# Patient Record
Sex: MALE | Race: BLACK OR AFRICAN AMERICAN | Employment: FULL TIME | URBAN - METROPOLITAN AREA
[De-identification: names, ages, dates, MRNs, and addresses within clinical notes are randomized per-mention and may not be internally consistent; named-entity substitution may affect disease eponyms.]

---

## 2023-12-11 ENCOUNTER — HOSPITAL ENCOUNTER (OUTPATIENT)
Facility: HOSPITAL | Age: 43
Discharge: HOME | End: 2023-12-14
Payer: COMMERCIAL

## 2023-12-11 VITALS
RESPIRATION RATE: 18 BRPM | BODY MASS INDEX: 24.39 KG/M2 | WEIGHT: 190.04 LBS | HEART RATE: 79 BPM | DIASTOLIC BLOOD PRESSURE: 88 MMHG | SYSTOLIC BLOOD PRESSURE: 172 MMHG | HEIGHT: 74 IN | TEMPERATURE: 97.9 F

## 2023-12-11 LAB
ABO + RH BLD: NORMAL
ALBUMIN SERPL-MCNC: 3.8 G/DL (ref 3.5–5)
ALBUMIN/GLOB SERPL: 1.1 (ref 1.1–2.2)
ALP SERPL-CCNC: 81 U/L (ref 45–117)
ALT SERPL-CCNC: <6 U/L (ref 12–78)
ANION GAP SERPL CALC-SCNC: 7 MMOL/L (ref 5–15)
APTT PPP: 27.1 SEC (ref 22.1–31)
AST SERPL-CCNC: 8 U/L (ref 15–37)
BASOPHILS # BLD: 0 K/UL (ref 0–0.1)
BASOPHILS NFR BLD: 0 % (ref 0–1)
BILIRUB SERPL-MCNC: 0.4 MG/DL (ref 0.2–1)
BLOOD GROUP ANTIBODIES SERPL: NORMAL
BUN SERPL-MCNC: 33 MG/DL (ref 6–20)
BUN/CREAT SERPL: 3 (ref 12–20)
CALCIUM SERPL-MCNC: 8.7 MG/DL (ref 8.5–10.1)
CHLORIDE SERPL-SCNC: 98 MMOL/L (ref 97–108)
CO2 SERPL-SCNC: 33 MMOL/L (ref 21–32)
CREAT SERPL-MCNC: 9.85 MG/DL (ref 0.7–1.3)
DIFFERENTIAL METHOD BLD: ABNORMAL
EKG ATRIAL RATE: 76 BPM
EKG DIAGNOSIS: NORMAL
EKG P AXIS: 73 DEGREES
EKG P-R INTERVAL: 166 MS
EKG Q-T INTERVAL: 408 MS
EKG QRS DURATION: 110 MS
EKG QTC CALCULATION (BAZETT): 459 MS
EKG R AXIS: 62 DEGREES
EKG T AXIS: 78 DEGREES
EKG VENTRICULAR RATE: 76 BPM
EOSINOPHIL # BLD: 0.1 K/UL (ref 0–0.4)
EOSINOPHIL NFR BLD: 1 % (ref 0–7)
ERYTHROCYTE [DISTWIDTH] IN BLOOD BY AUTOMATED COUNT: 17.7 % (ref 11.5–14.5)
EST. AVERAGE GLUCOSE BLD GHB EST-MCNC: 85 MG/DL
GLOBULIN SER CALC-MCNC: 3.6 G/DL (ref 2–4)
GLUCOSE SERPL-MCNC: 126 MG/DL (ref 65–100)
HBA1C MFR BLD: 4.6 % (ref 4–5.6)
HCT VFR BLD AUTO: 31.2 % (ref 36.6–50.3)
HGB BLD-MCNC: 9.6 G/DL (ref 12.1–17)
IMM GRANULOCYTES # BLD AUTO: 0 K/UL (ref 0–0.04)
IMM GRANULOCYTES NFR BLD AUTO: 0 % (ref 0–0.5)
INR PPP: 1 (ref 0.9–1.1)
LYMPHOCYTES # BLD: 1 K/UL (ref 0.8–3.5)
LYMPHOCYTES NFR BLD: 11 % (ref 12–49)
MCH RBC QN AUTO: 29 PG (ref 26–34)
MCHC RBC AUTO-ENTMCNC: 30.8 G/DL (ref 30–36.5)
MCV RBC AUTO: 94.3 FL (ref 80–99)
MONOCYTES # BLD: 0.8 K/UL (ref 0–1)
MONOCYTES NFR BLD: 9 % (ref 5–13)
NEUTS SEG # BLD: 6.6 K/UL (ref 1.8–8)
NEUTS SEG NFR BLD: 79 % (ref 32–75)
NRBC # BLD: 0 K/UL (ref 0–0.01)
NRBC BLD-RTO: 0 PER 100 WBC
PLATELET # BLD AUTO: 374 K/UL (ref 150–400)
PMV BLD AUTO: 10.5 FL (ref 8.9–12.9)
POTASSIUM SERPL-SCNC: 5.7 MMOL/L (ref 3.5–5.1)
PROT SERPL-MCNC: 7.4 G/DL (ref 6.4–8.2)
PROTHROMBIN TIME: 10.4 SEC (ref 9–11.1)
RBC # BLD AUTO: 3.31 M/UL (ref 4.1–5.7)
SODIUM SERPL-SCNC: 138 MMOL/L (ref 136–145)
SPECIMEN EXP DATE BLD: NORMAL
THERAPEUTIC RANGE: NORMAL SECS (ref 58–77)
WBC # BLD AUTO: 8.5 K/UL (ref 4.1–11.1)

## 2023-12-11 PROCEDURE — 93005 ELECTROCARDIOGRAM TRACING: CPT

## 2023-12-11 PROCEDURE — 83036 HEMOGLOBIN GLYCOSYLATED A1C: CPT

## 2023-12-11 PROCEDURE — 85610 PROTHROMBIN TIME: CPT

## 2023-12-11 PROCEDURE — 80053 COMPREHEN METABOLIC PANEL: CPT

## 2023-12-11 PROCEDURE — 85025 COMPLETE CBC W/AUTO DIFF WBC: CPT

## 2023-12-11 PROCEDURE — 86900 BLOOD TYPING SEROLOGIC ABO: CPT

## 2023-12-11 PROCEDURE — 86901 BLOOD TYPING SEROLOGIC RH(D): CPT

## 2023-12-11 PROCEDURE — 86850 RBC ANTIBODY SCREEN: CPT

## 2023-12-11 PROCEDURE — 85730 THROMBOPLASTIN TIME PARTIAL: CPT

## 2023-12-11 PROCEDURE — 36415 COLL VENOUS BLD VENIPUNCTURE: CPT

## 2023-12-11 RX ORDER — AMLODIPINE BESYLATE 10 MG/1
10 TABLET ORAL NIGHTLY
COMMUNITY

## 2023-12-11 RX ORDER — CHOLECALCIFEROL (VITAMIN D3) 1250 MCG
1 CAPSULE ORAL DAILY
COMMUNITY

## 2023-12-11 RX ORDER — CALCITRIOL 0.25 UG/1
0.25 CAPSULE, LIQUID FILLED ORAL DAILY
COMMUNITY

## 2023-12-11 ASSESSMENT — PAIN SCALES - GENERAL: PAINLEVEL_OUTOF10: 5

## 2023-12-11 ASSESSMENT — PAIN DESCRIPTION - ORIENTATION: ORIENTATION: LEFT

## 2023-12-11 ASSESSMENT — PAIN DESCRIPTION - DESCRIPTORS: DESCRIPTORS: ACHING;SORE

## 2023-12-11 ASSESSMENT — PAIN DESCRIPTION - LOCATION: LOCATION: GROIN

## 2023-12-11 ASSESSMENT — PAIN DESCRIPTION - PAIN TYPE: TYPE: ACUTE PAIN;SURGICAL PAIN

## 2023-12-11 NOTE — PERIOP NOTE
[address] INSTRUCTIONS    Surgery Date:   12/18/23    Your surgeon's office or [facility] staff will call you between 4 PM- 8 PM the day before surgery with your arrival time. If your surgery is on a Monday, you will receive a call the preceding Friday. If your surgeon;s office has given you arrival time, then go by that time. Please report to [facility] Patient Access/Admitting on the 1st floor. Bring your insurance card, photo identification, and any copayment ( if applicable). If you are going home the same day of your surgery, you must have a responsible adult to drive you home. You need to have a responsible adult to stay with you the first 24 hours after surgery and you should not drive a car for 24 hours following your surgery. If you are being admitted to the hospital, please leave personal belongings/luggage in your car until you have an assigned hospital room number. Nothing to eat or drink after midnight the night before surgery. This includes no water, gum, mints, coffee, juice, etc.  Please note special instructions, if applicable, below for medications. Do NOT drink alcohol or smoke 24 hours before surgery. STOP smoking for 14 days prior as it helps with breathing and healing after surgery. Please wear comfortable clothes. Wear glasses instead of contacts. We ask that all money and jewelry and valuables to be left at home. Wear no makeup, particularly mascara the day of surgery. All body piercings, rings, and jewelry need to be removed and left at home. Remove all nail polish except for clear. Please wear your hair loose or down. Please no pony-tails, buns, or any metal hair accessories. You may wear deodorant, unless having breast surgery. Do not shave any body area within 24 hours of your surgery. Please follow all instructions to avoid any potential surgical cancellation.   Should your physical condition change, (i.e. fever, cold, flu, etc.) please notify your

## 2023-12-11 NOTE — PERIOP NOTE
PT HAS HISTORY OF MRSA. SPOKE [address], NP. RECEIVED ORDER FOR MRSA SWAB DURING PAT APPOINTMENT.

## 2023-12-12 LAB
BACTERIA SPEC CULT: NORMAL
BACTERIA SPEC CULT: NORMAL
SERVICE CMNT-IMP: NORMAL

## 2023-12-12 NOTE — PERIOP NOTE
CALLED TO DR. GOLDEN'S OFFICE/SPOKE TO DASHA FOR NURSE TO CALL RE ABN PAT LABS.  (ALSO ADVISED THAT THESE LABS AND EKG WERE FAXED OVER THIS MORNING);    K+ 5.7  BUN 33  CREATININE 9.85  BUN/CREATININE RATIO 3  GFR 6  HGB 9.6  HCT 31.2

## 2024-04-01 NOTE — PERIOP NOTE
Phone interview completed with patient.  Pre-op instructions reviewed and discussed.  Medications reviewed and instructions given on when/if to stop/take prior to surgery.  Opportunity given for patient to ask questions, and questions answered.      Patient instructed to purchase CHG soap or Hibiclens OTC and follow directions as listed; advised to use night before surgery and day of surgery.      Patient stated he no longer wants to have the left dialysis graft placement done but wants to proceed with the removal of the PD catheter.  He stated he is just tired of all the procedures and being cut by doctors. Instructed him to contact Dr. Marion's office with this information. He will call them today.      Dennis at [facility] contacted for most recent office note and EKG to be faxed to PAT; fax number provided.  Patient did not know name of cardiac physician; he said he has been \"transferred around\" multiple times, starting at [facility], then [facility] and now [facility].

## 2024-04-04 ENCOUNTER — ANESTHESIA (OUTPATIENT)
Facility: HOSPITAL | Age: 44
End: 2024-04-04
Payer: COMMERCIAL

## 2024-04-04 ENCOUNTER — ANESTHESIA EVENT (OUTPATIENT)
Facility: HOSPITAL | Age: 44
End: 2024-04-04
Payer: COMMERCIAL

## 2024-04-04 ENCOUNTER — HOSPITAL ENCOUNTER (OUTPATIENT)
Facility: HOSPITAL | Age: 44
Setting detail: OUTPATIENT SURGERY
Discharge: HOME | End: 2024-04-04
Payer: COMMERCIAL

## 2024-04-04 VITALS
HEART RATE: 85 BPM | TEMPERATURE: 98.5 F | WEIGHT: 189 LBS | HEIGHT: 74 IN | RESPIRATION RATE: 14 BRPM | OXYGEN SATURATION: 97 % | DIASTOLIC BLOOD PRESSURE: 70 MMHG | SYSTOLIC BLOOD PRESSURE: 100 MMHG | BODY MASS INDEX: 24.26 KG/M2

## 2024-04-04 DIAGNOSIS — N18.6 ESRD (END STAGE RENAL DISEASE) (HCC): Primary | ICD-10-CM

## 2024-04-04 LAB
ANION GAP BLD CALC-SCNC: 6.6 MMOL/L (ref 10–20)
CA-I BLD-MCNC: 1.02 MMOL/L (ref 1.12–1.32)
CHLORIDE BLD-SCNC: 101 MMOL/L (ref 98–107)
CO2 BLD-SCNC: 28.4 MMOL/L (ref 21–32)
CREAT BLD-MCNC: 8.69 MG/DL (ref 0.6–1.3)
GLUCOSE BLD STRIP.AUTO-MCNC: 178 MG/DL (ref 65–117)
GLUCOSE BLD-MCNC: 96 MG/DL (ref 65–100)
POTASSIUM BLD-SCNC: 4.8 MMOL/L (ref 3.5–5.1)
SERVICE CMNT-IMP: ABNORMAL
SERVICE CMNT-IMP: ABNORMAL
SODIUM BLD-SCNC: 136 MMOL/L (ref 136–145)

## 2024-04-04 PROCEDURE — 3700000000 HC ANESTHESIA ATTENDED CARE

## 2024-04-04 PROCEDURE — 6360000002 HC RX W HCPCS

## 2024-04-04 PROCEDURE — 6360000002 HC RX W HCPCS: Performed by: ANESTHESIOLOGY

## 2024-04-04 PROCEDURE — 7100000010 HC PHASE II RECOVERY - FIRST 15 MIN

## 2024-04-04 PROCEDURE — 2500000003 HC RX 250 WO HCPCS

## 2024-04-04 PROCEDURE — 2500000003 HC RX 250 WO HCPCS: Performed by: NURSE ANESTHETIST, CERTIFIED REGISTERED

## 2024-04-04 PROCEDURE — 7100000000 HC PACU RECOVERY - FIRST 15 MIN

## 2024-04-04 PROCEDURE — 6370000000 HC RX 637 (ALT 250 FOR IP)

## 2024-04-04 PROCEDURE — 2580000003 HC RX 258: Performed by: NURSE ANESTHETIST, CERTIFIED REGISTERED

## 2024-04-04 PROCEDURE — 2580000003 HC RX 258: Performed by: ANESTHESIOLOGY

## 2024-04-04 PROCEDURE — 2580000003 HC RX 258

## 2024-04-04 PROCEDURE — 6370000000 HC RX 637 (ALT 250 FOR IP): Performed by: ANESTHESIOLOGY

## 2024-04-04 PROCEDURE — 7100000001 HC PACU RECOVERY - ADDTL 15 MIN

## 2024-04-04 PROCEDURE — 2709999900 HC NON-CHARGEABLE SUPPLY

## 2024-04-04 PROCEDURE — 80047 BASIC METABLC PNL IONIZED CA: CPT

## 2024-04-04 PROCEDURE — 64415 NJX AA&/STRD BRCH PLXS IMG: CPT | Performed by: ANESTHESIOLOGY

## 2024-04-04 PROCEDURE — 6360000002 HC RX W HCPCS: Performed by: NURSE ANESTHETIST, CERTIFIED REGISTERED

## 2024-04-04 PROCEDURE — 3600000012 HC SURGERY LEVEL 2 ADDTL 15MIN

## 2024-04-04 PROCEDURE — 3600000002 HC SURGERY LEVEL 2 BASE

## 2024-04-04 PROCEDURE — 82962 GLUCOSE BLOOD TEST: CPT

## 2024-04-04 PROCEDURE — 3700000001 HC ADD 15 MINUTES (ANESTHESIA)

## 2024-04-04 PROCEDURE — C1768 GRAFT, VASCULAR: HCPCS

## 2024-04-04 DEVICE — PROPATEN VASCULAR GRAFT TW 7MMX80CM HEPARIN
Type: IMPLANTABLE DEVICE | Site: ARM | Status: FUNCTIONAL
Brand: GORE PROPATEN VASCULAR GRAFT

## 2024-04-04 RX ORDER — EPHEDRINE SULFATE 50 MG/ML
INJECTION INTRAVENOUS PRN
Status: DISCONTINUED | OUTPATIENT
Start: 2024-04-04 | End: 2024-04-04 | Stop reason: SDUPTHER

## 2024-04-04 RX ORDER — NALOXONE HYDROCHLORIDE 0.4 MG/ML
INJECTION, SOLUTION INTRAMUSCULAR; INTRAVENOUS; SUBCUTANEOUS PRN
Status: DISCONTINUED | OUTPATIENT
Start: 2024-04-04 | End: 2024-04-04 | Stop reason: HOSPADM

## 2024-04-04 RX ORDER — SODIUM CHLORIDE 9 MG/ML
INJECTION, SOLUTION INTRAVENOUS PRN
Status: DISCONTINUED | OUTPATIENT
Start: 2024-04-04 | End: 2024-04-04 | Stop reason: HOSPADM

## 2024-04-04 RX ORDER — VASOPRESSIN 20 U/ML
INJECTION PARENTERAL PRN
Status: DISCONTINUED | OUTPATIENT
Start: 2024-04-04 | End: 2024-04-04 | Stop reason: SDUPTHER

## 2024-04-04 RX ORDER — OXYCODONE HYDROCHLORIDE 5 MG/1
5 TABLET ORAL
Status: DISCONTINUED | OUTPATIENT
Start: 2024-04-04 | End: 2024-04-04 | Stop reason: HOSPADM

## 2024-04-04 RX ORDER — PHENYLEPHRINE HCL IN 0.9% NACL 0.4MG/10ML
SYRINGE (ML) INTRAVENOUS PRN
Status: DISCONTINUED | OUTPATIENT
Start: 2024-04-04 | End: 2024-04-04 | Stop reason: SDUPTHER

## 2024-04-04 RX ORDER — LIDOCAINE HYDROCHLORIDE 10 MG/ML
1 INJECTION, SOLUTION EPIDURAL; INFILTRATION; INTRACAUDAL; PERINEURAL
Status: DISCONTINUED | OUTPATIENT
Start: 2024-04-04 | End: 2024-04-04 | Stop reason: HOSPADM

## 2024-04-04 RX ORDER — MIDAZOLAM HYDROCHLORIDE 2 MG/2ML
2 INJECTION, SOLUTION INTRAMUSCULAR; INTRAVENOUS
Status: COMPLETED | OUTPATIENT
Start: 2024-04-04 | End: 2024-04-04

## 2024-04-04 RX ORDER — KETAMINE HCL IN NACL, ISO-OSM 100MG/10ML
SYRINGE (ML) INJECTION PRN
Status: DISCONTINUED | OUTPATIENT
Start: 2024-04-04 | End: 2024-04-04 | Stop reason: SDUPTHER

## 2024-04-04 RX ORDER — PROCHLORPERAZINE EDISYLATE 5 MG/ML
5 INJECTION INTRAMUSCULAR; INTRAVENOUS
Status: DISCONTINUED | OUTPATIENT
Start: 2024-04-04 | End: 2024-04-04 | Stop reason: HOSPADM

## 2024-04-04 RX ORDER — HYDROMORPHONE HYDROCHLORIDE 1 MG/ML
0.5 INJECTION, SOLUTION INTRAMUSCULAR; INTRAVENOUS; SUBCUTANEOUS EVERY 5 MIN PRN
Status: DISCONTINUED | OUTPATIENT
Start: 2024-04-04 | End: 2024-04-04 | Stop reason: HOSPADM

## 2024-04-04 RX ORDER — SODIUM CHLORIDE 0.9 % (FLUSH) 0.9 %
5-40 SYRINGE (ML) INJECTION EVERY 12 HOURS SCHEDULED
Status: DISCONTINUED | OUTPATIENT
Start: 2024-04-04 | End: 2024-04-04 | Stop reason: HOSPADM

## 2024-04-04 RX ORDER — SODIUM CHLORIDE 0.9 % (FLUSH) 0.9 %
5-40 SYRINGE (ML) INJECTION PRN
Status: DISCONTINUED | OUTPATIENT
Start: 2024-04-04 | End: 2024-04-04 | Stop reason: HOSPADM

## 2024-04-04 RX ORDER — ACETAMINOPHEN 500 MG
1000 TABLET ORAL ONCE
Status: COMPLETED | OUTPATIENT
Start: 2024-04-04 | End: 2024-04-04

## 2024-04-04 RX ORDER — OXYCODONE HYDROCHLORIDE AND ACETAMINOPHEN 5; 325 MG/1; MG/1
1 TABLET ORAL EVERY 6 HOURS PRN
Qty: 12 TABLET | Refills: 0 | Status: SHIPPED | OUTPATIENT
Start: 2024-04-04 | End: 2024-04-07

## 2024-04-04 RX ORDER — IPRATROPIUM BROMIDE AND ALBUTEROL SULFATE 2.5; .5 MG/3ML; MG/3ML
SOLUTION RESPIRATORY (INHALATION)
Status: COMPLETED
Start: 2024-04-04 | End: 2024-04-04

## 2024-04-04 RX ORDER — IPRATROPIUM BROMIDE AND ALBUTEROL SULFATE 2.5; .5 MG/3ML; MG/3ML
1 SOLUTION RESPIRATORY (INHALATION)
Status: COMPLETED | OUTPATIENT
Start: 2024-04-04 | End: 2024-04-04

## 2024-04-04 RX ORDER — LIDOCAINE HYDROCHLORIDE 10 MG/ML
INJECTION, SOLUTION INFILTRATION; PERINEURAL PRN
Status: DISCONTINUED | OUTPATIENT
Start: 2024-04-04 | End: 2024-04-04 | Stop reason: HOSPADM

## 2024-04-04 RX ORDER — FENTANYL CITRATE 50 UG/ML
100 INJECTION, SOLUTION INTRAMUSCULAR; INTRAVENOUS
Status: COMPLETED | OUTPATIENT
Start: 2024-04-04 | End: 2024-04-04

## 2024-04-04 RX ORDER — SODIUM CHLORIDE, SODIUM LACTATE, POTASSIUM CHLORIDE, CALCIUM CHLORIDE 600; 310; 30; 20 MG/100ML; MG/100ML; MG/100ML; MG/100ML
INJECTION, SOLUTION INTRAVENOUS CONTINUOUS
Status: DISCONTINUED | OUTPATIENT
Start: 2024-04-04 | End: 2024-04-04 | Stop reason: HOSPADM

## 2024-04-04 RX ORDER — HYDRALAZINE HYDROCHLORIDE 20 MG/ML
10 INJECTION INTRAMUSCULAR; INTRAVENOUS
Status: DISCONTINUED | OUTPATIENT
Start: 2024-04-04 | End: 2024-04-04 | Stop reason: HOSPADM

## 2024-04-04 RX ORDER — ONDANSETRON 2 MG/ML
4 INJECTION INTRAMUSCULAR; INTRAVENOUS
Status: COMPLETED | OUTPATIENT
Start: 2024-04-04 | End: 2024-04-04

## 2024-04-04 RX ORDER — FENTANYL CITRATE 50 UG/ML
25 INJECTION, SOLUTION INTRAMUSCULAR; INTRAVENOUS EVERY 5 MIN PRN
Status: DISCONTINUED | OUTPATIENT
Start: 2024-04-04 | End: 2024-04-04 | Stop reason: HOSPADM

## 2024-04-04 RX ADMIN — IPRATROPIUM BROMIDE AND ALBUTEROL SULFATE 1 DOSE: 2.5; .5 SOLUTION RESPIRATORY (INHALATION) at 12:22

## 2024-04-04 RX ADMIN — Medication 80 MCG: at 10:51

## 2024-04-04 RX ADMIN — EPHEDRINE SULFATE 5 MG: 50 INJECTION INTRAVENOUS at 11:11

## 2024-04-04 RX ADMIN — MEPIVACAINE HYDROCHLORIDE 30 ML: 15 INJECTION, SOLUTION EPIDURAL; INFILTRATION at 10:21

## 2024-04-04 RX ADMIN — Medication 80 MCG: at 11:34

## 2024-04-04 RX ADMIN — EPHEDRINE SULFATE 10 MG: 50 INJECTION INTRAVENOUS at 11:34

## 2024-04-04 RX ADMIN — Medication 80 MCG: at 11:39

## 2024-04-04 RX ADMIN — Medication 20 MG: at 11:01

## 2024-04-04 RX ADMIN — Medication 20 MG: at 10:56

## 2024-04-04 RX ADMIN — EPHEDRINE SULFATE 5 MG: 50 INJECTION INTRAVENOUS at 11:16

## 2024-04-04 RX ADMIN — PROPOFOL 50 MG: 10 INJECTION, EMULSION INTRAVENOUS at 10:38

## 2024-04-04 RX ADMIN — HYDROMORPHONE HYDROCHLORIDE 0.5 MG: 1 INJECTION, SOLUTION INTRAMUSCULAR; INTRAVENOUS; SUBCUTANEOUS at 13:06

## 2024-04-04 RX ADMIN — ONDANSETRON 4 MG: 2 INJECTION INTRAMUSCULAR; INTRAVENOUS at 13:54

## 2024-04-04 RX ADMIN — VASOPRESSIN 0.5 UNITS: 20 INJECTION INTRAVENOUS at 11:23

## 2024-04-04 RX ADMIN — EPHEDRINE SULFATE 10 MG: 50 INJECTION INTRAVENOUS at 11:39

## 2024-04-04 RX ADMIN — WATER 2000 MG: 1 INJECTION INTRAMUSCULAR; INTRAVENOUS; SUBCUTANEOUS at 10:45

## 2024-04-04 RX ADMIN — VASOPRESSIN 0.5 UNITS: 20 INJECTION INTRAVENOUS at 11:39

## 2024-04-04 RX ADMIN — SODIUM CHLORIDE: 9 INJECTION, SOLUTION INTRAVENOUS at 09:46

## 2024-04-04 RX ADMIN — FENTANYL CITRATE 100 MCG: 50 INJECTION INTRAMUSCULAR; INTRAVENOUS at 10:16

## 2024-04-04 RX ADMIN — PROPOFOL 50 MCG/KG/MIN: 10 INJECTION, EMULSION INTRAVENOUS at 10:39

## 2024-04-04 RX ADMIN — EPHEDRINE SULFATE 5 MG: 50 INJECTION INTRAVENOUS at 10:58

## 2024-04-04 RX ADMIN — PHENYLEPHRINE HYDROCHLORIDE 40 MCG/MIN: 10 INJECTION INTRAVENOUS at 10:46

## 2024-04-04 RX ADMIN — Medication 80 MCG: at 10:54

## 2024-04-04 RX ADMIN — MIDAZOLAM HYDROCHLORIDE 2 MG: 1 INJECTION, SOLUTION INTRAMUSCULAR; INTRAVENOUS at 10:16

## 2024-04-04 RX ADMIN — ACETAMINOPHEN 1000 MG: 500 TABLET ORAL at 10:07

## 2024-04-04 RX ADMIN — VASOPRESSIN 0.5 UNITS: 20 INJECTION INTRAVENOUS at 11:30

## 2024-04-04 RX ADMIN — Medication 80 MCG: at 11:07

## 2024-04-04 ASSESSMENT — PAIN SCALES - GENERAL: PAINLEVEL_OUTOF10: 7

## 2024-04-04 ASSESSMENT — PAIN DESCRIPTION - LOCATION: LOCATION: ARM

## 2024-04-04 ASSESSMENT — PAIN DESCRIPTION - DESCRIPTORS
DESCRIPTORS: ACHING
DESCRIPTORS: SORE

## 2024-04-04 ASSESSMENT — PAIN - FUNCTIONAL ASSESSMENT: PAIN_FUNCTIONAL_ASSESSMENT: 0-10

## 2024-04-04 ASSESSMENT — PAIN DESCRIPTION - ORIENTATION: ORIENTATION: LEFT

## 2024-04-04 NOTE — OP NOTE
[address]  [address]                            OPERATIVE REPORT      PATIENT NAME: AGNIESZKA MENCHACA             : 1980  MED REC NO: [id number]                       ROOM: OR  ACCOUNT NO: [id number]                       ADMIT DATE: 2024  PROVIDER: Saw Marion MD    DATE OF SERVICE:  2024    PREOPERATIVE DIAGNOSES:  Renal failure.    POSTOPERATIVE DIAGNOSES:  Renal failure.    PROCEDURES PERFORMED:       1. Insertion left upper arm Wapwallopen-Allen dialysis graft.     2. Removal of peritoneal dialysis catheter.    SURGEON:  Saw Marion MD    ASSISTANT:  Annie    ANESTHESIA:  Regional block local and sedation.    ESTIMATED BLOOD LOSS:  Minimal.    SPECIMENS REMOVED:  None.    INTRAOPERATIVE FINDINGS:  See below.     COMPLICATIONS:  None.    IMPLANTS:  7 mm Wapwallopen-Allen graft.    INDICATIONS:  The patient is a 44-year-old male with end-stage renal disease, currently on hemodialysis using a tunneled catheter.  He had previously been on peritoneal dialysis, but this was discontinued.  Preoperative vein mapping shows inadequate veins for a native fistula.  He will have a graft placed.  His PD catheter will be removed as well.    DESCRIPTION OF PROCEDURE:  The patient's left arm and right upper abdomen were prepped and draped.  Attention was turned first to the arm.  A longitudinal incision was made in the proximal medial upper arm.  The brachial artery and basilic vein were dissected free.  A 7 mm thin-walled Wapwallopen-Allen graft was tunneled in a loop configuration in the upper arm using three counter incisions.  The arterial side was lateral.  The graft was sewn end-to-side to the brachial artery and then end-to-side to the basilic vein using running CV-5 Wapwallopen-Allen suture.  At the completion, there was a palpable thrill in the graft.  The incisions were closed with Vicryl subcutaneous suture and skin staples.    Attention was then

## 2024-04-04 NOTE — ANESTHESIA PRE PROCEDURE
Department of Anesthesiology  Preprocedure Note       Name:  hTeo Anand   Age:  44 y.o.  :  1980                                          MRN:  [id number]         Date:  2024      Surgeon: Surgeon(s):  Saw Mraion MD    Procedure: Procedure(s):  REMOVAL PERITONEAL DIALYSIS CATHETER AND LEFT UPPER ARM DIALYSIS GRAFT PLACEMENT (REG. BLOCK)  .    Medications prior to admission:   Prior to Admission medications    Medication Sig Start Date End Date Taking? Authorizing Provider   calcitRIOL (ROCALTROL) 0.25 MCG capsule Take 1 capsule by mouth daily    Provider, MD Juju   amLODIPine (NORVASC) 10 MG tablet Take 0.5 tablets by mouth in the morning and 0.5 tablets in the evening.    ProviderJuju MD   aspirin 81 MG EC tablet Take 1 tablet by mouth daily 10/21/20   Automatic Reconciliation, Ar   atorvastatin (LIPITOR) 80 MG tablet Take 1 tablet by mouth at bedtime    Automatic Reconciliation, Ar   clopidogrel (PLAVIX) 75 MG tablet Take 1 tablet by mouth daily 10/16/20   Automatic Reconciliation, Ar   furosemide (LASIX) 20 MG tablet Take 2 tablets by mouth 2 times daily 3/17/21   Automatic Reconciliation, Ar   nebivolol (BYSTOLIC) 10 MG tablet Take 1 tablet by mouth 2 times daily 21   Automatic Reconciliation, Ar   ondansetron (ZOFRAN-ODT) 4 MG disintegrating tablet Take 1 tablet by mouth every 8 hours as needed for Nausea 10/10/22   Automatic Reconciliation, Ar       Current medications:    Current Facility-Administered Medications   Medication Dose Route Frequency Provider Last Rate Last Admin    ceFAZolin (ANCEF) 2,000 mg in sterile water 20 mL IV syringe  2,000 mg IntraVENous Once Saw Marion MD        lidocaine PF 1 % injection 1 mL  1 mL IntraDERmal Once PRN BlackwellHalimah I, DO        acetaminophen (TYLENOL) tablet 1,000 mg  1,000 mg Oral Once Blackwell Lou I, DO        fentaNYL (SUBLIMAZE) injection 100 mcg  100 mcg IntraVENous Once PRN Halima Blackwellh I, DO

## 2024-04-04 NOTE — DISCHARGE INSTRUCTIONS
Remove arm and abdominal dressings on Sat and leave open    Follow up with Dr Marion 2 weeks - call [phone] for appt    ______________________________________________________________________    Anesthesia Discharge Instructions    After general anesthesia or intervenous sedation, for 24 hours or while taking prescription Narcotics:  Limit your activities  Do not drive or operate hazardous machinery  If you have not urinated within 8 hours after discharge, please contact your surgeon on call.  Do not make important personal or business decisions  Do not drink alcoholic beverages    Report the following to your surgeon:  Excessive pain, swelling, redness or odor of or around the surgical area  Temperature over 100.5 degrees  Nausea and vomiting lasting longer than 4 hours or if unable to take medication  Any signs of decreased circulation or nerve impairment to extremity:  Change in color, persistent numbness, tingling, coldness or increased pain.  Any questions

## 2024-04-04 NOTE — ANESTHESIA PROCEDURE NOTES
Peripheral Block    Patient location during procedure: pre-op  Reason for block: post-op pain management and at surgeon's request  Start time: 4/4/2024 10:21 AM  Staffing  Performed: anesthesiologist   Performed by: Lou Blackwell DO  Authorized by: Lou Blackwell DO    Preanesthetic Checklist  Completed: patient identified, IV checked, site marked, risks and benefits discussed, surgical/procedural consents, equipment checked, pre-op evaluation, timeout performed, anesthesia consent given, oxygen available, monitors applied/VS acknowledged and fire risk safety assessment completed and verbalized  Peripheral Block   Patient position: supine  Prep: ChloraPrep  Provider prep: mask and sterile gloves  Patient monitoring: cardiac monitor, continuous pulse ox, frequent blood pressure checks, IV access and oxygen  Block type: Brachial plexus  Supraclavicular  Laterality: left  Injection technique: single-shot  Guidance: ultrasound guided  Local infiltration: ropivacaine  Infiltration strength: 0.5 %  Local infiltration: ropivacaine    Needle   Needle type: insulated echogenic nerve stimulator needle   Needle gauge: 21 G  Needle localization: anatomical landmarks and ultrasound guidance  Needle length: 10 cm  Assessment   Injection assessment: negative aspiration for heme, no paresthesia on injection, local visualized surrounding nerve on ultrasound and no intravascular symptoms  Paresthesia pain: none  Slow fractionated injection: yes  Hemodynamics: stable  Outcomes: uncomplicated    Medications Administered  mepivacaine (CARBOCAINE) injection 1.5% - Perineural   30 mL - 4/4/2024 10:21:00 AM

## 2024-04-04 NOTE — PROGRESS NOTES
Discharge instructions reviewed with patient and family using teachback. All questions have been answered. Prescriptions sent to [facility]. Vital signs stable, pain appropriately managed. Patient wheeled off the unit with PACU staff.

## 2024-04-04 NOTE — BRIEF OP NOTE
Brief Postoperative Note      Patient: Theo Anand  YOB: 1980  MRN: [id number]    Date of Procedure: 4/4/2024    Pre-Op Diagnosis Codes:     * End stage renal disease (HCC) [N18.6]    Post-Op Diagnosis: Same       Procedure(s):  REMOVAL PERITONEAL DIALYSIS CATHETER AND LEFT UPPER ARM DIALYSIS GRAFT PLACEMENT (REG. BLOCK)  .    Surgeon(s):  Saw Marion MD    Assistant:  Surgical Assistant: Annie Sequeira    Anesthesia: Regional    Estimated Blood Loss (mL): Minimal    Complications: None    Specimens:   * No specimens in log *    Implants:  Implant Name Type Inv. Item Serial No.  Lot No. LRB No. Used Action   GRAFT VASC L80CM ID7MM EPTFE [employer] W[employer] N - [id number] Vascular grafts GRAFT VASC L80CM ID7MM EPTFE THN WALLED [employer] N [id number]  [employer] NA Left 1 Implanted         Drains: * No LDAs found *    Findings:  Infection Present At Time Of Surgery (PATOS) (choose all levels that have infection present):  No infection present  Other Findings: none    Electronically signed by Saw Marion MD on 4/4/2024 at 11:49 AM

## 2024-04-04 NOTE — ANESTHESIA POSTPROCEDURE EVALUATION
Department of Anesthesiology  Postprocedure Note    Patient: Theo Anand  MRN: [id number]  YOB: 1980  Date of evaluation: 4/4/2024    Procedure Summary       Date: 04/04/24 Room / Location: [facility] MAIN OR [facility] MAIN OR    Anesthesia Start: 1031 Anesthesia Stop: 1202    Procedures:       REMOVAL PERITONEAL DIALYSIS CATHETER AND LEFT UPPER ARM DIALYSIS GRAFT PLACEMENT (REG. BLOCK)      . (Left) Diagnosis:       End stage renal disease (HCC)      (End stage renal disease (HCC) [N18.6])    Providers: Saw Marion MD Responsible Provider: Lou Blackwell DO    Anesthesia Type: MAC, Regional ASA Status: 3            Anesthesia Type: MAC, Regional    Stevie Phase I: Stevie Score: 9    Stevie Phase II:      Anesthesia Post Evaluation    Patient location during evaluation: PACU  Level of consciousness: awake  Airway patency: patent  Nausea & Vomiting: no nausea  Cardiovascular status: hemodynamically stable  Respiratory status: acceptable  Hydration status: stable  Multimodal analgesia pain management approach  Pain management: adequate    No notable events documented.

## 2025-01-28 NOTE — PROGRESS NOTES
JENNIFER patient - reviewed pre-op instructions, Holding Warfarin 3 days prior to surgery. States understanding and no further questions.   New surgery date 1/31/2025.

## 2025-01-31 ENCOUNTER — ANESTHESIA (OUTPATIENT)
Facility: HOSPITAL | Age: 45
End: 2025-01-31
Payer: MEDICARE

## 2025-01-31 ENCOUNTER — HOSPITAL ENCOUNTER (OUTPATIENT)
Facility: HOSPITAL | Age: 45
Setting detail: OUTPATIENT SURGERY
Discharge: HOME | End: 2025-01-31
Attending: SURGERY | Admitting: SURGERY
Payer: MEDICARE

## 2025-01-31 ENCOUNTER — ANESTHESIA EVENT (OUTPATIENT)
Facility: HOSPITAL | Age: 45
End: 2025-01-31
Payer: MEDICARE

## 2025-01-31 VITALS
OXYGEN SATURATION: 95 % | BODY MASS INDEX: 24.36 KG/M2 | HEART RATE: 82 BPM | SYSTOLIC BLOOD PRESSURE: 187 MMHG | RESPIRATION RATE: 18 BRPM | HEIGHT: 74 IN | DIASTOLIC BLOOD PRESSURE: 86 MMHG | WEIGHT: 189.82 LBS | TEMPERATURE: 97.6 F

## 2025-01-31 DIAGNOSIS — T82.898A STEAL SYNDROME AS COMPLICATION OF DIALYSIS ACCESS, INITIAL ENCOUNTER (HCC): Primary | ICD-10-CM

## 2025-01-31 LAB
ANION GAP BLD CALC-SCNC: 8.3 MMOL/L (ref 10–20)
APTT PPP: 30.9 SEC (ref 22.1–31)
BASOPHILS # BLD: 0.06 K/UL (ref 0–0.1)
BASOPHILS NFR BLD: 0.7 % (ref 0–1)
CA-I BLD-MCNC: 1.17 MMOL/L (ref 1.15–1.33)
CHLORIDE BLD-SCNC: 99 MMOL/L (ref 98–107)
CO2 BLD-SCNC: 32.7 MMOL/L (ref 21–32)
CREAT BLD-MCNC: 8.32 MG/DL (ref 0.6–1.3)
DIFFERENTIAL METHOD BLD: ABNORMAL
EOSINOPHIL # BLD: 0.13 K/UL (ref 0–0.4)
EOSINOPHIL NFR BLD: 1.4 % (ref 0–7)
ERYTHROCYTE [DISTWIDTH] IN BLOOD BY AUTOMATED COUNT: 22.6 % (ref 11.5–14.5)
GLUCOSE BLD-MCNC: 109 MG/DL (ref 74–99)
HCT VFR BLD AUTO: 29.7 % (ref 36.6–50.3)
HGB BLD-MCNC: 9.4 G/DL (ref 12.1–17)
IMM GRANULOCYTES # BLD AUTO: 0.04 K/UL (ref 0–0.04)
IMM GRANULOCYTES NFR BLD AUTO: 0.4 % (ref 0–0.5)
INR PPP: 1.1 (ref 0.9–1.1)
LYMPHOCYTES # BLD: 1.2 K/UL (ref 0.8–3.5)
LYMPHOCYTES NFR BLD: 13.2 % (ref 12–49)
MCH RBC QN AUTO: 26.9 PG (ref 26–34)
MCHC RBC AUTO-ENTMCNC: 31.6 G/DL (ref 30–36.5)
MCV RBC AUTO: 85.1 FL (ref 80–99)
MONOCYTES # BLD: 0.93 K/UL (ref 0–1)
MONOCYTES NFR BLD: 10.2 % (ref 5–13)
NEUTS SEG # BLD: 6.74 K/UL (ref 1.8–8)
NEUTS SEG NFR BLD: 74.1 % (ref 32–75)
NRBC # BLD: 0 K/UL (ref 0–0.01)
NRBC BLD-RTO: 0 PER 100 WBC
PLATELET # BLD AUTO: 274 K/UL (ref 150–400)
PMV BLD AUTO: 11.9 FL (ref 8.9–12.9)
POTASSIUM BLD-SCNC: 5.2 MMOL/L (ref 3.5–5.1)
PROTHROMBIN TIME: 11.4 SEC (ref 9.2–11.2)
RBC # BLD AUTO: 3.49 M/UL (ref 4.1–5.7)
RBC MORPH BLD: ABNORMAL
SERVICE CMNT-IMP: ABNORMAL
SODIUM BLD-SCNC: 140 MMOL/L (ref 136–145)
THERAPEUTIC RANGE: NORMAL SECS (ref 58–77)
WBC # BLD AUTO: 9.1 K/UL (ref 4.1–11.1)

## 2025-01-31 PROCEDURE — 6370000000 HC RX 637 (ALT 250 FOR IP): Performed by: ANESTHESIOLOGY

## 2025-01-31 PROCEDURE — 6370000000 HC RX 637 (ALT 250 FOR IP): Performed by: SURGERY

## 2025-01-31 PROCEDURE — 85610 PROTHROMBIN TIME: CPT

## 2025-01-31 PROCEDURE — 2580000003 HC RX 258: Performed by: ANESTHESIOLOGY

## 2025-01-31 PROCEDURE — 6360000002 HC RX W HCPCS: Performed by: ANESTHESIOLOGY

## 2025-01-31 PROCEDURE — 85025 COMPLETE CBC W/AUTO DIFF WBC: CPT

## 2025-01-31 PROCEDURE — 7100000000 HC PACU RECOVERY - FIRST 15 MIN: Performed by: SURGERY

## 2025-01-31 PROCEDURE — 36415 COLL VENOUS BLD VENIPUNCTURE: CPT

## 2025-01-31 PROCEDURE — 6360000002 HC RX W HCPCS: Performed by: SURGERY

## 2025-01-31 PROCEDURE — 7100000010 HC PHASE II RECOVERY - FIRST 15 MIN: Performed by: SURGERY

## 2025-01-31 PROCEDURE — 6360000002 HC RX W HCPCS

## 2025-01-31 PROCEDURE — 3600000012 HC SURGERY LEVEL 2 ADDTL 15MIN: Performed by: SURGERY

## 2025-01-31 PROCEDURE — 3700000001 HC ADD 15 MINUTES (ANESTHESIA): Performed by: SURGERY

## 2025-01-31 PROCEDURE — 7100000011 HC PHASE II RECOVERY - ADDTL 15 MIN: Performed by: SURGERY

## 2025-01-31 PROCEDURE — 80047 BASIC METABLC PNL IONIZED CA: CPT

## 2025-01-31 PROCEDURE — 7100000001 HC PACU RECOVERY - ADDTL 15 MIN: Performed by: SURGERY

## 2025-01-31 PROCEDURE — C1768 GRAFT, VASCULAR: HCPCS | Performed by: SURGERY

## 2025-01-31 PROCEDURE — 3700000000 HC ANESTHESIA ATTENDED CARE: Performed by: SURGERY

## 2025-01-31 PROCEDURE — 85730 THROMBOPLASTIN TIME PARTIAL: CPT

## 2025-01-31 PROCEDURE — 64415 NJX AA&/STRD BRCH PLXS IMG: CPT | Performed by: ANESTHESIOLOGY

## 2025-01-31 PROCEDURE — 2500000003 HC RX 250 WO HCPCS: Performed by: SURGERY

## 2025-01-31 PROCEDURE — 2709999900 HC NON-CHARGEABLE SUPPLY: Performed by: SURGERY

## 2025-01-31 PROCEDURE — 2580000003 HC RX 258

## 2025-01-31 PROCEDURE — 3600000002 HC SURGERY LEVEL 2 BASE: Performed by: SURGERY

## 2025-01-31 DEVICE — STRETCH VASCULAR GRAFT SW 4-7MMX70CM TAPERED
Type: IMPLANTABLE DEVICE | Site: ARM | Status: FUNCTIONAL
Brand: GORE-TEX   STRETCH VASCULAR GRAFT

## 2025-01-31 RX ORDER — SODIUM CHLORIDE 9 MG/ML
INJECTION, SOLUTION INTRAVENOUS
Status: DISCONTINUED | OUTPATIENT
Start: 2025-01-31 | End: 2025-01-31 | Stop reason: SDUPTHER

## 2025-01-31 RX ORDER — ROPIVACAINE HYDROCHLORIDE 5 MG/ML
INJECTION, SOLUTION EPIDURAL; INFILTRATION; PERINEURAL
Status: COMPLETED | OUTPATIENT
Start: 2025-01-31 | End: 2025-01-31

## 2025-01-31 RX ORDER — DROPERIDOL 2.5 MG/ML
0.62 INJECTION, SOLUTION INTRAMUSCULAR; INTRAVENOUS
Status: DISCONTINUED | OUTPATIENT
Start: 2025-01-31 | End: 2025-01-31 | Stop reason: HOSPADM

## 2025-01-31 RX ORDER — MIDAZOLAM HYDROCHLORIDE 1 MG/ML
INJECTION, SOLUTION INTRAMUSCULAR; INTRAVENOUS
Status: COMPLETED | OUTPATIENT
Start: 2025-01-31 | End: 2025-01-31

## 2025-01-31 RX ORDER — PHENYLEPHRINE HCL IN 0.9% NACL 0.4MG/10ML
SYRINGE (ML) INTRAVENOUS
Status: DISCONTINUED | OUTPATIENT
Start: 2025-01-31 | End: 2025-01-31 | Stop reason: SDUPTHER

## 2025-01-31 RX ORDER — OXYCODONE AND ACETAMINOPHEN 5; 325 MG/1; MG/1
1 TABLET ORAL EVERY 6 HOURS PRN
Qty: 12 TABLET | Refills: 0 | Status: SHIPPED | OUTPATIENT
Start: 2025-01-31 | End: 2025-02-03

## 2025-01-31 RX ORDER — VANCOMYCIN 1.5 G/300ML
1500 INJECTION, SOLUTION INTRAVENOUS ONCE
Status: COMPLETED | OUTPATIENT
Start: 2025-01-31 | End: 2025-01-31

## 2025-01-31 RX ORDER — SODIUM CHLORIDE 9 MG/ML
INJECTION, SOLUTION INTRAVENOUS PRN
Status: DISCONTINUED | OUTPATIENT
Start: 2025-01-31 | End: 2025-01-31 | Stop reason: HOSPADM

## 2025-01-31 RX ORDER — LIDOCAINE HYDROCHLORIDE 20 MG/ML
INJECTION, SOLUTION EPIDURAL; INFILTRATION; INTRACAUDAL; PERINEURAL
Status: COMPLETED | OUTPATIENT
Start: 2025-01-31 | End: 2025-01-31

## 2025-01-31 RX ORDER — SODIUM CHLORIDE 0.9 % (FLUSH) 0.9 %
5-40 SYRINGE (ML) INJECTION EVERY 12 HOURS SCHEDULED
Status: DISCONTINUED | OUTPATIENT
Start: 2025-01-31 | End: 2025-01-31 | Stop reason: HOSPADM

## 2025-01-31 RX ORDER — LIDOCAINE HYDROCHLORIDE 10 MG/ML
1 INJECTION, SOLUTION EPIDURAL; INFILTRATION; INTRACAUDAL; PERINEURAL
Status: DISCONTINUED | OUTPATIENT
Start: 2025-01-31 | End: 2025-01-31 | Stop reason: HOSPADM

## 2025-01-31 RX ORDER — SODIUM CHLORIDE 0.9 % (FLUSH) 0.9 %
5-40 SYRINGE (ML) INJECTION PRN
Status: DISCONTINUED | OUTPATIENT
Start: 2025-01-31 | End: 2025-01-31 | Stop reason: HOSPADM

## 2025-01-31 RX ORDER — ONDANSETRON 2 MG/ML
INJECTION INTRAMUSCULAR; INTRAVENOUS
Status: DISCONTINUED | OUTPATIENT
Start: 2025-01-31 | End: 2025-01-31 | Stop reason: SDUPTHER

## 2025-01-31 RX ORDER — HYDROMORPHONE HYDROCHLORIDE 1 MG/ML
0.5 INJECTION, SOLUTION INTRAMUSCULAR; INTRAVENOUS; SUBCUTANEOUS EVERY 5 MIN PRN
Status: DISCONTINUED | OUTPATIENT
Start: 2025-01-31 | End: 2025-01-31 | Stop reason: HOSPADM

## 2025-01-31 RX ORDER — FENTANYL CITRATE 50 UG/ML
25 INJECTION, SOLUTION INTRAMUSCULAR; INTRAVENOUS EVERY 5 MIN PRN
Status: DISCONTINUED | OUTPATIENT
Start: 2025-01-31 | End: 2025-01-31 | Stop reason: HOSPADM

## 2025-01-31 RX ORDER — SODIUM CHLORIDE, SODIUM LACTATE, POTASSIUM CHLORIDE, CALCIUM CHLORIDE 600; 310; 30; 20 MG/100ML; MG/100ML; MG/100ML; MG/100ML
INJECTION, SOLUTION INTRAVENOUS CONTINUOUS
Status: DISCONTINUED | OUTPATIENT
Start: 2025-01-31 | End: 2025-01-31 | Stop reason: HOSPADM

## 2025-01-31 RX ORDER — ONDANSETRON 2 MG/ML
4 INJECTION INTRAMUSCULAR; INTRAVENOUS
Status: DISCONTINUED | OUTPATIENT
Start: 2025-01-31 | End: 2025-01-31 | Stop reason: HOSPADM

## 2025-01-31 RX ORDER — NALOXONE HYDROCHLORIDE 0.4 MG/ML
INJECTION, SOLUTION INTRAMUSCULAR; INTRAVENOUS; SUBCUTANEOUS PRN
Status: DISCONTINUED | OUTPATIENT
Start: 2025-01-31 | End: 2025-01-31 | Stop reason: HOSPADM

## 2025-01-31 RX ORDER — SODIUM CHLORIDE 9 MG/ML
INJECTION, SOLUTION INTRAVENOUS CONTINUOUS
Status: DISCONTINUED | OUTPATIENT
Start: 2025-01-31 | End: 2025-01-31 | Stop reason: HOSPADM

## 2025-01-31 RX ORDER — ACETAMINOPHEN 500 MG
1000 TABLET ORAL
Status: COMPLETED | OUTPATIENT
Start: 2025-01-31 | End: 2025-01-31

## 2025-01-31 RX ORDER — ACETAMINOPHEN 500 MG
500 TABLET ORAL EVERY 6 HOURS PRN
COMMUNITY

## 2025-01-31 RX ADMIN — MIDAZOLAM HYDROCHLORIDE 2 MG: 1 INJECTION, SOLUTION INTRAMUSCULAR; INTRAVENOUS at 11:47

## 2025-01-31 RX ADMIN — ROPIVACAINE HYDROCHLORIDE 25 ML: 5 INJECTION, SOLUTION EPIDURAL; INFILTRATION; PERINEURAL at 11:51

## 2025-01-31 RX ADMIN — PROPOFOL 100 MCG/KG/MIN: 10 INJECTION, EMULSION INTRAVENOUS at 13:38

## 2025-01-31 RX ADMIN — Medication 100 MCG: at 13:51

## 2025-01-31 RX ADMIN — ACETAMINOPHEN 1000 MG: 500 TABLET ORAL at 15:15

## 2025-01-31 RX ADMIN — WATER 2000 MG: 1 INJECTION INTRAMUSCULAR; INTRAVENOUS; SUBCUTANEOUS at 13:39

## 2025-01-31 RX ADMIN — SODIUM CHLORIDE 25 ML: 9 INJECTION, SOLUTION INTRAVENOUS at 11:56

## 2025-01-31 RX ADMIN — VANCOMYCIN 1500 MG: 1.5 INJECTION, SOLUTION INTRAVENOUS at 11:59

## 2025-01-31 RX ADMIN — LIDOCAINE HYDROCHLORIDE 5 ML: 20 INJECTION, SOLUTION EPIDURAL; INFILTRATION; INTRACAUDAL; PERINEURAL at 11:51

## 2025-01-31 RX ADMIN — Medication 3 AMPULE: at 11:43

## 2025-01-31 RX ADMIN — ONDANSETRON HYDROCHLORIDE 4 MG: 2 INJECTION, SOLUTION INTRAMUSCULAR; INTRAVENOUS at 13:43

## 2025-01-31 RX ADMIN — SODIUM CHLORIDE: 9 INJECTION, SOLUTION INTRAVENOUS at 13:32

## 2025-01-31 ASSESSMENT — PAIN - FUNCTIONAL ASSESSMENT
PAIN_FUNCTIONAL_ASSESSMENT: 0-10
PAIN_FUNCTIONAL_ASSESSMENT: PREVENTS OR INTERFERES SOME ACTIVE ACTIVITIES AND ADLS

## 2025-01-31 NOTE — DISCHARGE INSTRUCTIONS
Patient Discharge Instructions    Theo GARCIA Kika / [id number] : 1980    Admitted 2025 Discharged: 2025     It is important that you take the medication exactly as they are prescribed.   Keep your medication in the bottles provided by the pharmacist and keep a list of the medication names, dosages, and times to be taken in your wallet.   Do not take other medications without consulting your doctor.       What to do at Home    Wound Care: You may remove dressings on  and leave open to air.    Follow-up with Dr Schmitz in 2 weeks [phone]        Information obtained by :  I understand that if any problems occur once I am at home I am to contact my physician.    I understand and acknowledge receipt of the instructions indicated above.                                                                                                                                           Physician's or R.N.'s Signature                                                                  Date/Time                                                                                                                                              Patient or Representative Signature                                                          Date/Time      TAKE NARCOTIC PAIN MEDICATIONS WITH FOOD!    For the night of surgery, while block is still in effect, start with 1 pain pill at bedtime    Narcotics tend to be constipating and we recommend taking a stool softener such as Colace or Miralax (follow package instructions).    If you were given prescriptions, please review the written information on the prescribed medications.    DO NOT DRIVE WHILE TAKING NARCOTIC PAIN MEDICATIONS.    CPAP PATIENTS BE SURE TO WEAR MACHINE WHENEVER NAPPING OR SLEEPING DAY/NIGHT OF SURGERY!    PATIENT INSTRUCTIONS:    After General Anesthesia or Intravenous Sedation, for 24 hours or while taking prescription Narcotics:  Someone should be with you for the  receive an e-mail or letter in the mail from [facility] regarding your experience with us in the Ambulatory Surgery Unit. Your feedback is valuable to us and we appreciate your participation in the survey.     If the above instructions are not adequate or you are having problems after your surgery, call your physician at their office number.    We wish you’re a speedy recovery ?      What to do at Home:    *  Please give a list of your current medications to your Primary Care Provider.    *  Please update this list whenever your medications are discontinued, doses are      changed, or new medications (including over-the-counter products) are added.    *  Please carry medication information at all times in case of emergency situations.      If you have not had your influenza or pneumococcal vaccines, please follow up with your primary care physician.    The discharge information has been reviewed with the patient and caregiver.  The patient and caregiver verbalized understanding.    TO PREVENT AN INFECTION      WASH YOUR HANDS    To prevent infection, good handwashing is the most important thing you or your caregiver can do.      Wash your hands with soap and water or use the hand  we gave you before you touch any wounds.    SHOWER    Use the antibacterial soap we gave you when you take a shower.     Shower with this soap until your wounds are healed.      To reach all areas of your body, you may need someone to help you.     Don’t forget to clean your belly button with every shower.     USE CLEAN SHEETS    Use freshly cleaned sheets on your bed after surgery.     To keep the surgery site clean, do not allow pets to sleep with you while your wound is still healing.     STOP SMOKING    Stop smoking, or at least cut back on smoking    Smoking slows your healing.          CONTROL YOUR BLOOD SUGAR    High blood sugars slow wound healing.    If you are diabetic, control your blood sugar levels before and after

## 2025-01-31 NOTE — H&P
Vascular Surgery History & Physical    Subjective:     Theo Anand is a 44 y.o.  male with left arm graft with steal syndrome presenting for banding.     Past Medical History:   Diagnosis Date    Anxiety and depression     CAD (coronary artery disease) 2016    2 CARDIAC STENTS PLACED PER PT    CHF (congestive heart failure) (HCC)     Diabetes mellitus (HCC)     ESRD (end stage renal disease) ([facility])     ON TRANSPLANT LIST    GERD (gastroesophageal reflux disease)     Hemodialysis patient ([facility])     [facility] [phone]    Hyperlipidemia     Hypertension     MRSA (methicillin resistant staph aureus) culture positive 2021    LEFT GROIN AFTER STENT PLACED 2 YEARS AGO PER PT    PAD (peripheral artery disease) ([facility])     2 STENTS, 1 IN EACH GROIN PER PT    PONV (postoperative nausea and vomiting)     Sleep apnea-like behavior     PT STATES HE HAD NEGATIVE SLEEP STUDY      Past Surgical History:   Procedure Laterality Date    APPENDECTOMY  2013    CARDIAC SURGERY  06/28/2024    CABG x2 - Mitral valve replacement - Dr. Grider & Dr. Foley    COLONOSCOPY      CORONARY ANGIOPLASTY WITH STENT PLACEMENT  2016    2 METAL STENTS PLACED PER PT    DIALYSIS CATHETER INSERTION N/A 12/18/2023    LAPAROSCOPIC REPOSITIONING OF PERITONEAL DIALYSIS CATHETER (EIP 0730) performed by Saw Marion MD at [facility] MAIN OR    DIALYSIS CATHETER REMOVAL N/A 04/04/2024    REMOVAL PERITONEAL DIALYSIS CATHETER AND LEFT UPPER ARM DIALYSIS GRAFT PLACEMENT (REG. BLOCK) performed by Saw Marion MD at [facility] MAIN OR    DIALYSIS FISTULA CREATION Left 04/04/2024    . performed by Saw Marion MD at [facility] MAIN OR    IR NONTUNNELED VASCULAR CATHETER > 5 YEARS      PERIPHERAL ARTERIAL STENT GRAFT Bilateral     1 IN EACH SIDE PER PT, WENT IN LEFT SIDE TO REPOSITION STENT AND LOOK AT BLOODFLOW PER PT 12/7/23    PERITONEAL CATHETER INSERTION      PORTACATH PLACEMENT Right     TOE AMPUTATION Bilateral     RIGHT PINKY TOE,  LEFT THIRD TOE    TONSILLECTOMY      WISDOM TOOTH EXTRACTION       Family History   Problem Relation Age of Onset    Breast Cancer Mother     PONV Mother     Anesth Problems Mother         PONV    Heart Disease Father     High Blood Pressure Father     High Cholesterol Father     Peripheral Vascular Disease Father     Diabetes Sister     Diabetes Sister     Diabetes Sister     Diabetes Brother     Thyroid Disease Brother       Social History     Tobacco Use    Smoking status: Former     Current packs/day: 0.00     Types: Cigarettes     Quit date:      Years since quittin.0    Smokeless tobacco: Never   Substance Use Topics    Alcohol use: Not Currently     Comment: NONE IN OVER A YEAR PER PT       Prior to Admission medications    Medication Sig Start Date End Date Taking? Authorizing Provider   acetaminophen (TYLENOL) 500 MG tablet Take 1 tablet by mouth every 6 hours as needed for Pain   Yes Provider, MD Juju   warfarin (COUMADIN) 7.5 MG tablet Take 1 tablet by mouth daily   Yes ProviderJuju MD   amLODIPine (NORVASC) 10 MG tablet Take 0.5 tablets by mouth in the morning and 0.5 tablets in the evening.   Yes ProviderJuju MD   aspirin 81 MG EC tablet Take 1 tablet by mouth daily 10/21/20  Yes Automatic Reconciliation, Ar   atorvastatin (LIPITOR) 80 MG tablet Take 1 tablet by mouth at bedtime   Yes Automatic Reconciliation, Ar   calcitRIOL (ROCALTROL) 0.25 MCG capsule Take 1 capsule by mouth daily    ProviderJuju MD   clopidogrel (PLAVIX) 75 MG tablet Take 1 tablet by mouth daily  Patient not taking: Reported on 2025 10/16/20   Automatic Reconciliation, Ar   furosemide (LASIX) 20 MG tablet Take 2 tablets by mouth 2 times daily  Patient not taking: Reported on 2025 3/17/21   Automatic Reconciliation, Ar   ondansetron (ZOFRAN-ODT) 4 MG disintegrating tablet Take 1 tablet by mouth every 8 hours as needed for Nausea  Patient not taking: Reported on 2025 10/10/22    Automatic Reconciliation, Ar     Allergies   Allergen Reactions    Other      PAPER TAPE - CAUSES ITCHING AND RASH        Review of Systems   All other systems reviewed and are negative.      Objective:     Patient Vitals for the past 24 hrs:   BP Temp Temp src Pulse Resp SpO2 Height Weight   01/31/25 1316 (!) 165/77 -- -- 83 27 100 % -- --   01/31/25 1257 (!) 158/80 -- -- 83 25 100 % -- --   01/31/25 1240 (!) 153/78 -- -- 84 20 99 % -- --   01/31/25 1226 (!) 169/82 -- -- 85 19 100 % -- --   01/31/25 1222 (!) 161/78 -- -- 92 24 100 % -- --   01/31/25 1215 (!) 159/77 -- -- -- -- 100 % -- --   01/31/25 1205 (!) 158/80 -- -- -- -- -- -- --   01/31/25 1054 (!) 183/82 97.7 °F (36.5 °C) Oral 88 18 99 % 1.88 m (6' 2\") 86.1 kg (189 lb 13.1 oz)       Physical Exam  Constitutional:       Appearance: Normal appearance.   HENT:      Head: Normocephalic.      Mouth/Throat:      Mouth: Mucous membranes are dry.   Eyes:      Extraocular Movements: Extraocular movements intact.      Pupils: Pupils are equal, round, and reactive to light.   Cardiovascular:      Rate and Rhythm: Normal rate.   Pulmonary:      Effort: Pulmonary effort is normal.   Abdominal:      Palpations: Abdomen is soft.   Musculoskeletal:         General: Normal range of motion.      Cervical back: Normal range of motion.   Skin:     General: Skin is warm.      Capillary Refill: Capillary refill takes less than 2 seconds.   Neurological:      General: No focal deficit present.      Mental Status: He is alert.   Psychiatric:         Mood and Affect: Mood normal.     Left arm graft    Data Review:   Recent Results (from the past 24 hour(s))   EKG 12 Lead    Collection Time: 01/31/25 11:00 AM   Result Value Ref Range    Ventricular Rate 85 BPM    Atrial Rate 85 BPM    P-R Interval 162 ms    QRS Duration 110 ms    Q-T Interval 410 ms    QTc Calculation (Bazett) 487 ms    P Axis 67 degrees    R Axis 3 degrees    T Axis 92 degrees    Diagnosis       Normal sinus

## 2025-01-31 NOTE — ANESTHESIA PRE PROCEDURE
Department of Anesthesiology  Preprocedure Note       Name:  Theo Anand   Age:  44 y.o.  :  1980                                          MRN:  [id number]         Date:  2025      Surgeon: Surgeon(s):  Richy Schmitz MD    Procedure: Procedure(s):  LEFT ARM GRAFT BANDING    Medications prior to admission:   Prior to Admission medications    Medication Sig Start Date End Date Taking? Authorizing Provider   acetaminophen (TYLENOL) 500 MG tablet Take 1 tablet by mouth every 6 hours as needed for Pain   Yes Provider, MD Juju   warfarin (COUMADIN) 7.5 MG tablet Take 1 tablet by mouth daily   Yes Provider, MD Juju   amLODIPine (NORVASC) 10 MG tablet Take 0.5 tablets by mouth in the morning and 0.5 tablets in the evening.   Yes ProviderJuju MD   aspirin 81 MG EC tablet Take 1 tablet by mouth daily 10/21/20  Yes Automatic Reconciliation, Ar   atorvastatin (LIPITOR) 80 MG tablet Take 1 tablet by mouth at bedtime   Yes Automatic Reconciliation, Ar   calcitRIOL (ROCALTROL) 0.25 MCG capsule Take 1 capsule by mouth daily    ProviderJuju MD   clopidogrel (PLAVIX) 75 MG tablet Take 1 tablet by mouth daily  Patient not taking: Reported on 2025 10/16/20   Automatic Reconciliation, Ar   furosemide (LASIX) 20 MG tablet Take 2 tablets by mouth 2 times daily  Patient not taking: Reported on 2025 3/17/21   Automatic Reconciliation, Ar   ondansetron (ZOFRAN-ODT) 4 MG disintegrating tablet Take 1 tablet by mouth every 8 hours as needed for Nausea  Patient not taking: Reported on 2025 10/10/22   Automatic Reconciliation, Ar       Current medications:    Current Facility-Administered Medications   Medication Dose Route Frequency Provider Last Rate Last Admin   • 0.9 % sodium chloride infusion   IntraVENous Continuous Jericho Sarmiento MD       • ceFAZolin (ANCEF) 2,000 mg in sterile water 20 mL IV syringe  2,000 mg IntraVENous Once Richy Schmitz MD       • vancomycin

## 2025-01-31 NOTE — PERIOP NOTE
Theo Anand  1980  [id number]    Situation:  Verbal report given from: Lola Dejesus RN  Procedure: Procedure(s):  LEFT ARM GRAFT BANDING    Background:    Preoperative diagnosis: ESRD (end stage renal disease) (HCC) [N18.6]    Postoperative diagnosis: * No post-op diagnosis entered *    :  Dr. Schmitz    Assistant(s): Circulator: Jayna Huddelston RN  Surgical Assistant: Mj Hardin Scrub: Mariela Scruggs  Scrub Person First: Jade Gooden    Specimens: * No specimens in log *    Assessment:  Intra-procedure medications         Anesthesia gave intra-procedure sedation and medications, see anesthesia flow sheet     Intravenous fluids: LR@ KVO     Vital signs stable       Recommendation:    Permission to share finding with father

## 2025-01-31 NOTE — PERIOP NOTE
Pt. States ready for discharge.  Vss.  Denies pain.  Dressing to left upper arm intact.  Sling placed to left arm.  Discharge instructions reviewed w/pt and father.  They verbalized understanding.  Pt discharged via wheelchair to car, accompanied by RN.  Pt discharged awake and alert, respirations equal and unlabored, skin warm, dry, and intact.  Pt and family members' questions and concerns addressed prior to discharge.

## 2025-01-31 NOTE — PERIOP NOTE
Patient received to PACU, VSS. Patient drowsy but arouses to voice. Dressing to LUE intact with no drainage noted. Patient has no complaints of pain.     1456: Patient resting comfortably, rates pain 0/10. Patient tolerating liquids at this time.     1514: Patient complaints of headache, order received from Dr. Sarmiento for Tylenol 1000mg PO now.     Patient BP on admission 183/82, now 174/85 - Dr. Sarmiento aware of patient BP, states patient ok for discharge. Patient states he is due to take BP med at home this afternoon.     1521: Report to AGNES Morrison RN and patient transferred to Phase 2 at this time.

## 2025-01-31 NOTE — OR NURSING
Irrisept 450ml used PRN for irrigation  ISEPT-450-[facility]  Lot: [id number]  Exp: 8/31/2027

## 2025-01-31 NOTE — ANESTHESIA POSTPROCEDURE EVALUATION
Department of Anesthesiology  Postprocedure Note    Patient: Theo Anand  MRN: [id number]  YOB: 1980  Date of evaluation: 1/31/2025    Procedure Summary       Date: 01/31/25 Room / Location: [facility] MAIN OR M2 / [facility] MAIN OR    Anesthesia Start: 1332 Anesthesia Stop: 1439    Procedure: LEFT ARM GRAFT BANDING (Left: Arm Upper) Diagnosis:       ESRD (end stage renal disease) ([facility])      (ESRD (end stage renal disease) ([facility]) [N18.6])    Providers: Richy Schmitz MD Responsible Provider: Jericho Sarmiento MD    Anesthesia Type: MAC, regional ASA Status: 3            Anesthesia Type: No value filed.    Stevie Phase I: Stevie Score: 7    Stevie Phase II:      Anesthesia Post Evaluation    Patient location during evaluation: PACU  Patient participation: complete - patient participated  Level of consciousness: sleepy but conscious and responsive to verbal stimuli  Pain score: 1  Airway patency: patent  Nausea & Vomiting: no vomiting and no nausea  Cardiovascular status: blood pressure returned to baseline and hemodynamically stable  Respiratory status: acceptable  Hydration status: stable  Multimodal analgesia pain management approach  Pain management: adequate    No notable events documented.

## 2025-01-31 NOTE — PERIOP NOTE
Theo Anand  1980  [id number]    Situation:  Verbal report given from: RN and CRNA  Procedure: Procedure(s):  LEFT ARM GRAFT BANDING    Background:    Preoperative diagnosis: ESRD (end stage renal disease) (HCC) [N18.6]    Postoperative diagnosis: * No post-op diagnosis entered *    :  Dr. Schmitz    Assistant(s): Circulator: Jayna Huddleston RN  Surgical Assistant: Mj Hardin Scrub: Mariela Scruggs  Scrub Person First: Jade Gooden    Specimens: * No specimens in log *    Assessment:  Intra-procedure medications         Anesthesia gave intra-procedure sedation and medications, see anesthesia flow sheet     Intravenous fluids: LR@ KVO     Vital signs stable       Recommendation:    Permission to share finding with family

## 2025-01-31 NOTE — ANESTHESIA PROCEDURE NOTES
Peripheral Block    Patient location during procedure: holding area  Reason for block: at surgeon's request  Start time: 1/31/2025 11:47 AM  End time: 1/31/2025 11:51 AM  Staffing  Performed: anesthesiologist   Anesthesiologist: Jessica Foley MD  Performed by: Jessica Foley MD  Authorized by: Jessica Foley MD    Preanesthetic Checklist  Completed: patient identified, IV checked, site marked, risks and benefits discussed, surgical/procedural consents, equipment checked, pre-op evaluation, timeout performed, anesthesia consent given, oxygen available, monitors applied/VS acknowledged, fire risk safety assessment completed and verbalized and blood product R/B/A discussed and consented  Peripheral Block   Patient position: supine  Prep: ChloraPrep  Provider prep: sterile gloves, mask and sterile gown  Patient monitoring: cardiac monitor, continuous pulse ox, continuous capnometry, frequent blood pressure checks, IV access, oxygen and responsive to questions  Block type: Brachial plexus  Supraclavicular  Laterality: left  Injection technique: single-shot  Guidance: ultrasound guided    Needle   Needle type: insulated echogenic nerve stimulator needle   Needle gauge: 22 G  Needle localization: nerve stimulator and ultrasound guidance  Needle insertion depth: 4 cm  Needle length: 5 cm  Assessment   Injection assessment: negative aspiration for heme, no paresthesia on injection, local visualized surrounding nerve on ultrasound and no intravascular symptoms  Paresthesia pain: none  Slow fractionated injection: yes  Hemodynamics: stable  Outcomes: uncomplicated    Medications Administered  midazolam (VERSED) injection 2 mg/2mL - IntraVENous   2 mg - 1/31/2025 11:47:00 AM  ropivacaine (NAROPIN) injection 0.5% - Perineural   25 mL - 1/31/2025 11:51:00 AM  lidocaine 2 % (PF) injection - Perineural   5 mL - 1/31/2025 11:51:00 AM

## 2025-02-02 LAB
EKG ATRIAL RATE: 85 BPM
EKG DIAGNOSIS: NORMAL
EKG P AXIS: 67 DEGREES
EKG P-R INTERVAL: 162 MS
EKG Q-T INTERVAL: 410 MS
EKG QRS DURATION: 110 MS
EKG QTC CALCULATION (BAZETT): 487 MS
EKG R AXIS: 3 DEGREES
EKG T AXIS: 92 DEGREES
EKG VENTRICULAR RATE: 85 BPM

## 2025-02-07 NOTE — OP NOTE
Operative Note      Patient: Theo Anand  YOB: 1980  MRN: [id number]    Date of Procedure: 1/31/2025    Pre-Op Diagnosis Codes:      * ESRD (end stage renal disease) ([facility]) [N18.6]    Post-Op Diagnosis: Same       Procedure(s):  LEFT ARM GRAFT BANDING    Surgeon(s):  Richy Schmitz MD    Assistant:   Surgical Assistant: Mj Hardin    Anesthesia: General    Estimated Blood Loss (mL): Minimal    Complications: None    Specimens:   * No specimens in log *    Implants:  Implant Name Type Inv. Item Serial No.  Lot No. LRB No. Used Action   GRAFT VASC L70CM DIA4 7MM TAPR STRTCH STD WALLED GOR [place] - [id number] Vascular grafts GRAFT VASC L70CM DIA4 7MM TAPR STRTCH STD WALLED [place] [id number] [employer]-WD  Left 1 Implanted         Drains: * No LDAs found *    Findings:  Infection Present At Time Of Surgery (PATOS) (choose all levels that have infection present):  No infection present  Other Findings: good signal after banding    Detailed Description of Procedure:   The patient was brought into the operating room and prepped and draped in the usual sterile fashion.  An incision was made over his graft in the upper arm and this was dissected free and encircled with a vessel loop.  Next a 4 to 6 mm tapered graft was brought onto the field and cut and used to band the graft with a narrow portion that was 4 mm.  This was sutured into place with 5-0 Prolene sutures.  Upon completion there was still a bruit and thrill in the graft as well as improved signal in the wrist.  The incision was closed with Vicryl followed by nylon sutures.  The needle and sponge counts were correct x 2.  Sterile dressings were applied.  The patient tolerated the procedure well and was taken the PACU in stable condition    Electronically signed by Richy Schmitz MD on 2/7/2025 at 2:12 PM

## (undated) DEVICE — SPONGE GZ W4XL4IN COT RADPQ HIGHLY ABSRB

## (undated) DEVICE — GARMENT,MEDLINE,DVT,INT,CALF,MED, GEN2: Brand: MEDLINE

## (undated) DEVICE — Z DISC USE 2752451 SUTURE VCRL SZ 3-0 L27IN ABSRB UD FS-2 L19MM 1/2 CIR J423H

## (undated) DEVICE — OPTIFOAM GENTLE SA, POSTOP, 4X8: Brand: MEDLINE

## (undated) DEVICE — 450 ML BOTTLE OF 0.05% CHLORHEXIDINE GLUCONATE IN 99.95% STERILE WATER FOR IRRIGATION, USP AND APPLICATOR.: Brand: IRRISEPT ANTIMICROBIAL WOUND LAVAGE

## (undated) DEVICE — SOLUTION IRRIG 1000ML STRL H2O USP PLAS POUR BTL

## (undated) DEVICE — ELECTRODE PT RET AD L9FT HI MOIST COND ADH HYDRGEL CORDED

## (undated) DEVICE — BASIN ST MAJOR-NO CAUTERY: Brand: MEDLINE INDUSTRIES, INC.

## (undated) DEVICE — GLOVE SURG SZ 75 CRM LTX FREE POLYISOPRENE POLYMER BEAD ANTI

## (undated) DEVICE — Device

## (undated) DEVICE — INTENT OT USE PROVIDES A STERILE INTERFACE BETWEEN THE OPERATING ROOM SURGICAL LAMPS (NON-STERILE) AND THE SURGEON OR STAFF WORKING IN THE STERILE FIELD.: Brand: ASPEN® ALC PLUS LIGHT HANDLE COVER

## (undated) DEVICE — AV FISTULA - MRMC: Brand: MEDLINE INDUSTRIES, INC.

## (undated) DEVICE — SUTURE VCRL SZ 2-0 L36IN ABSRB UD L36MM CT-1 1/2 CIR J945H

## (undated) DEVICE — Z DISC USE 2220190 SUTURE VCRL SZ 3-0 L27IN ABSRB UD L26MM SH 1/2 CIR J416H

## (undated) DEVICE — PACK,LAPAROTOMY,2 REINFORCED GOWNS: Brand: MEDLINE

## (undated) DEVICE — SYRINGE MED 10ML LUERLOCK TIP W/O SFTY DISP

## (undated) DEVICE — SPONGE GZ W4XL4IN COT 12 PLY TYP VII WVN C FLD DSGN STERILE

## (undated) DEVICE — APPLICATOR MEDICATED 26 CC SOLUTION HI LT ORNG CHLORAPREP

## (undated) DEVICE — SUTURE PERMAHAND SZ 0 L30IN NONABSORBABLE BLK SILK BRAID A306H

## (undated) DEVICE — SOLUTION IV 500ML 0.9% SOD CHL PH 5 INJ USP VIAFLX PLAS

## (undated) DEVICE — SUTURE NONABSORBABLE MONOFILAMENT 3-0 PS-1 18 IN BLK ETHILON 1663H

## (undated) DEVICE — SUTURE NONABSORBABLE MONOFILAMENT 5-0 C-1 1X24 IN PROLENE 8725H

## (undated) DEVICE — SUTURE PROL SZ 3-0 L36IN NONABSORBABLE BLU L17MM RB-1 1/2 8558H

## (undated) DEVICE — PENCIL SMK EVAC 10 FT BLADE ELECTRD ROCKER FOR TELSCP

## (undated) DEVICE — GLOVE ORANGE PI 7   MSG9070

## (undated) DEVICE — SOLUTION IRRIG 1000ML 0.9% SOD CHL USP POUR PLAS BTL

## (undated) DEVICE — TOWEL SURG W17XL27IN STD BLU COT NONFENESTRATED PREWASHED

## (undated) DEVICE — PENCIL ES CRD L10FT HND SWCHING ROCK SWCH W/ EDGE COAT BLDE

## (undated) DEVICE — X-RAY DETECTABLE SPONGES,16 PLY: Brand: VISTEC

## (undated) DEVICE — HYPODERMIC SAFETY NEEDLE: Brand: MONOJECT